# Patient Record
Sex: FEMALE | RURAL
[De-identification: names, ages, dates, MRNs, and addresses within clinical notes are randomized per-mention and may not be internally consistent; named-entity substitution may affect disease eponyms.]

---

## 2022-03-31 ENCOUNTER — APPOINTMENT (RX ONLY)
Dept: RURAL CLINIC 3 | Facility: CLINIC | Age: 27
Setting detail: DERMATOLOGY
End: 2022-03-31

## 2022-03-31 DIAGNOSIS — Z41.9 ENCOUNTER FOR PROCEDURE FOR PURPOSES OTHER THAN REMEDYING HEALTH STATE, UNSPECIFIED: ICD-10-CM

## 2022-03-31 PROCEDURE — ? BOTOX

## 2022-03-31 NOTE — PROCEDURE: BOTOX
Map Statement: Please see attached map for locations and injection amounts.
Post-Care Instructions: No complications occurred during the procedure. After-care instructions were given to the client and was told to use non-blood-thinning medication for pain, such as Tylenol if no allergies are present, and ice for unwanted swelling. The patient was instructed to not lie down for 4 hours and limit physical activity for 24 hours. Before pictures are attached to the chart. Follow-up was scheduled to be PRN.
Consent: Went over benefits and risks with the client and decided on a treatment plan. Risks include but not limited to lid/brow ptosis, bruising, swelling, diplopia, temporary effect, incomplete chemical denervation. Received electronic consent and signed as witness. Cleansed the area with Puracyn and alcohol prior to the procedure. Injected upper and lower face. Unit map in attachments.
Detail Level: Detailed
Expiration Date (Month Year): 05/2024
Lot #: [id number]
Total Units: 26

## 2023-01-13 ENCOUNTER — APPOINTMENT (RX ONLY)
Dept: RURAL CLINIC 2 | Facility: CLINIC | Age: 28
Setting detail: DERMATOLOGY
End: 2023-01-13

## 2023-01-13 DIAGNOSIS — Z41.9 ENCOUNTER FOR PROCEDURE FOR PURPOSES OTHER THAN REMEDYING HEALTH STATE, UNSPECIFIED: ICD-10-CM

## 2023-01-13 PROCEDURE — ? COSMETIC CONSULTATION: TATTOO REMOVAL

## 2023-01-13 PROCEDURE — ? INVENTORY

## 2023-01-13 PROCEDURE — ? PALOMAR ICON LASER

## 2023-01-13 PROCEDURE — ? COSMETIC CONSULTATION: LASER RESURFACING

## 2023-01-13 NOTE — PROCEDURE: PALOMAR ICON LASER
Render Post Care In The Note?: yes
Pulse Duration (In Milliseconds): 15
Treated Area: small area
Number Of Passes: 3
Detail Level: Zone
Treatment Number: 1
Consent: Written consent obtained, risks reviewed including but not limited to crusting, scabbing, blistering, scarring, darker or lighter pigmentary change, bruising, and/or incomplete response.
Overlap: 50%
Location: full face
Pre-Procedure Text: The patient's skin was cleaned.
Location: right cheek
Total Pulses (Will Not Render If Blank): 30
Fluence: 40
Post-Care Instructions: I reviewed with the patient in detail post-care instructions. Patient should stay away from the sun and wear sun protection until treated areas are fully healed.
Anesthesia Type: 1% lidocaine with epinephrine
Overlap: 10%
External Cooling: contact cooling
Number Of Passes: 2
Fluence Units: mJ/mB
Fluence Units: J/cm2

## 2023-01-13 NOTE — HPI: SCAR
Is This A New Presentation, Or A Follow-Up?: Scar
Additional History: Patient has had  this area before. She is not sure what kind of filler was used.

## 2023-02-13 ENCOUNTER — APPOINTMENT (RX ONLY)
Dept: RURAL CLINIC 2 | Facility: CLINIC | Age: 28
Setting detail: DERMATOLOGY
End: 2023-02-13

## 2023-02-13 DIAGNOSIS — Z41.9 ENCOUNTER FOR PROCEDURE FOR PURPOSES OTHER THAN REMEDYING HEALTH STATE, UNSPECIFIED: ICD-10-CM

## 2023-02-13 PROCEDURE — ? INVENTORY

## 2023-02-13 PROCEDURE — ? PALOMAR ICON LASER

## 2023-02-13 NOTE — PROCEDURE: PALOMAR ICON LASER
Render Post Care In The Note?: yes
Pulse Duration (In Milliseconds): 15
Treated Area: small area
Number Of Passes: 3
Detail Level: Zone
Treatment Number: 2
Consent: Written consent obtained, risks reviewed including but not limited to crusting, scabbing, blistering, scarring, darker or lighter pigmentary change, bruising, and/or incomplete response.
Overlap: 50%
Location: full face
Pre-Procedure Text: The patient's skin was cleaned.
Location: right cheek
Total Pulses (Will Not Render If Blank): 41
Fluence: 40
Number Of Passes: 1
Post-Care Instructions: I reviewed with the patient in detail post-care instructions. Patient should stay away from the sun and wear sun protection until treated areas are fully healed.
Fluence: 30
Anesthesia Type: 1% lidocaine with epinephrine
Overlap: 10%
External Cooling: contact cooling
Fluence Units: mJ/mB
Fluence Units: J/cm2

## 2023-03-22 ENCOUNTER — APPOINTMENT (RX ONLY)
Dept: RURAL CLINIC 2 | Facility: CLINIC | Age: 28
Setting detail: DERMATOLOGY
End: 2023-03-22

## 2023-03-22 DIAGNOSIS — Z41.9 ENCOUNTER FOR PROCEDURE FOR PURPOSES OTHER THAN REMEDYING HEALTH STATE, UNSPECIFIED: ICD-10-CM

## 2023-03-22 PROCEDURE — ? PALOMAR ICON LASER

## 2023-03-22 PROCEDURE — ? INVENTORY

## 2023-03-22 ASSESSMENT — LOCATION DETAILED DESCRIPTION DERM: LOCATION DETAILED: RIGHT INFERIOR CENTRAL MALAR CHEEK

## 2023-03-22 ASSESSMENT — LOCATION ZONE DERM: LOCATION ZONE: FACE

## 2023-03-22 ASSESSMENT — LOCATION SIMPLE DESCRIPTION DERM: LOCATION SIMPLE: RIGHT CHEEK

## 2023-03-22 NOTE — PROCEDURE: PALOMAR ICON LASER
Render Post Care In The Note?: yes
Pulse Duration (In Milliseconds): 15
Treated Area: small area
Number Of Passes: 3
Detail Level: Zone
Consent: Written consent obtained, risks reviewed including but not limited to crusting, scabbing, blistering, scarring, darker or lighter pigmentary change, bruising, and/or incomplete response.
Overlap: 50%
Location: full face
Pre-Procedure Text: The patient's skin was cleaned.
Location: right cheek
Fluence: 40
Number Of Passes: 1
Post-Care Instructions: I reviewed with the patient in detail post-care instructions. Patient should stay away from the sun and wear sun protection until treated areas are fully healed.
Fluence: 30
Anesthesia Type: 1% lidocaine with epinephrine
Overlap: 10%
Number Of Passes: 2
Fluence Units: mJ/mB
Fluence Units: J/cm2

## 2023-06-13 ENCOUNTER — APPOINTMENT (RX ONLY)
Dept: RURAL CLINIC 2 | Facility: CLINIC | Age: 28
Setting detail: DERMATOLOGY
End: 2023-06-13

## 2023-06-13 DIAGNOSIS — Z41.9 ENCOUNTER FOR PROCEDURE FOR PURPOSES OTHER THAN REMEDYING HEALTH STATE, UNSPECIFIED: ICD-10-CM

## 2023-06-13 PROCEDURE — ? INVENTORY

## 2023-06-13 PROCEDURE — ? PALOMAR ICON LASER

## 2023-06-13 ASSESSMENT — LOCATION DETAILED DESCRIPTION DERM: LOCATION DETAILED: RIGHT INFERIOR CENTRAL MALAR CHEEK

## 2023-06-13 ASSESSMENT — LOCATION ZONE DERM: LOCATION ZONE: FACE

## 2023-06-13 ASSESSMENT — LOCATION SIMPLE DESCRIPTION DERM: LOCATION SIMPLE: RIGHT CHEEK

## 2023-06-13 NOTE — PROCEDURE: PALOMAR ICON LASER
Render Post Care In The Note?: yes
Pulse Duration (In Milliseconds): 15
Treated Area: small area
Number Of Passes: 3
Detail Level: Zone
External Cooling Fan Speed: 5
Treatment Number: 4
Consent: Written consent obtained, risks reviewed including but not limited to crusting, scabbing, blistering, scarring, darker or lighter pigmentary change, bruising, and/or incomplete response.
Overlap: 50%
Location: full face
Pre-Procedure Text: The patient's skin was cleaned.
Location: right cheek
Fluence: 40
Number Of Passes: 1
Post-Care Instructions: I reviewed with the patient in detail post-care instructions. Patient should stay away from the sun and wear sun protection until treated areas are fully healed.
Fluence: 30
Anesthesia Type: 1% lidocaine with epinephrine
Overlap: 10%
External Cooling: [employer] 6
Number Of Passes: 2
Fluence Units: mJ/mB
Fluence Units: J/cm2